# Patient Record
(demographics unavailable — no encounter records)

---

## 2024-11-04 NOTE — REASON FOR VISIT
[Initial Evaluation] : an initial evaluation for [FreeTextEntry2] : Snoring possible JOSE DANIEL referred by Dr Denson

## 2024-11-04 NOTE — HISTORY OF PRESENT ILLNESS
[de-identified] : Patient comes to the office for follow-up.  Continues to do well with Wegovy and is stabilized as far as weight.  But is also helping him with other behavioral patterns.  Reports snoring is much reduced now that he is lost his weight but continues to do so and often has daytime somnolence. Continues to follow-up with therapist with multiple medications without any changes in dosage.

## 2025-04-23 NOTE — HISTORY OF PRESENT ILLNESS
[Home] : at home, [unfilled] , at the time of the visit. [Medical Office: ([facility])___] : at the medical office located in  [Telehealth (audio & video)] : This visit was provided via telehealth using real-time 2-way audio visual technology. [Technical] : patient unable to effectively utilize tele-video due to technical issues. [Verbal consent obtained from patient] : the patient, [unfilled] [FreeTextEntry8] : Patient was in [place] for a function and at the end of the medication started to have profuse watery diarrhea that is almost every hour.  Especially when anything oral is taking any.  No fever shortness of breath or difficulty breathing.  No blood or mucus.  Discomfort at abdomen only while having bowel movements. Other members of the family were sick before the start of the event.

## 2025-06-27 NOTE — HEALTH RISK ASSESSMENT
[No] : No [No falls in past year] : Patient reported no falls in the past year [Little interest or pleasure doing things] : 1) Little interest or pleasure doing things [Feeling down, depressed, or hopeless] : 2) Feeling down, depressed, or hopeless [0] : 2) Feeling down, depressed, or hopeless: Not at all (0) [PHQ-2 Negative - No further assessment needed] : PHQ-2 Negative - No further assessment needed [Never] : Never [Audit-CScore] : 0 [[id number]] : 0

## 2025-06-27 NOTE — HISTORY OF PRESENT ILLNESS
[FreeTextEntry8] : Patient comes to the office with history of profuse diarrhea this week.  Was in [place] on a trip but now recently came back.  No nausea vomiting fever shortness of breath or difficulty breathing.  There is some abdominal discomfort with the diarrhea.  Can be going 15 or more times in the day.  As of yesterday has decreased and now having semisolid stools. No blood or mucus noted.  No significant abdominal pain or tenderness. Similar type of diarrhea when he went away to [place] this year in April. at that time had 3 days of symptoms and was given Cipro with rapid response.